# Patient Record
Sex: MALE | Race: WHITE | NOT HISPANIC OR LATINO | URBAN - METROPOLITAN AREA
[De-identification: names, ages, dates, MRNs, and addresses within clinical notes are randomized per-mention and may not be internally consistent; named-entity substitution may affect disease eponyms.]

---

## 2017-01-11 ENCOUNTER — OUTPATIENT (OUTPATIENT)
Dept: OUTPATIENT SERVICES | Facility: HOSPITAL | Age: 22
LOS: 1 days | End: 2017-01-11
Payer: COMMERCIAL

## 2017-01-11 DIAGNOSIS — E84.0 CYSTIC FIBROSIS WITH PULMONARY MANIFESTATIONS: ICD-10-CM

## 2017-01-11 PROCEDURE — 77001 FLUOROGUIDE FOR VEIN DEVICE: CPT

## 2017-01-11 PROCEDURE — 76937 US GUIDE VASCULAR ACCESS: CPT | Mod: 26

## 2017-01-11 PROCEDURE — 77001 FLUOROGUIDE FOR VEIN DEVICE: CPT | Mod: 26

## 2017-01-11 PROCEDURE — 36569 INSJ PICC 5 YR+ W/O IMAGING: CPT

## 2017-01-11 PROCEDURE — 76937 US GUIDE VASCULAR ACCESS: CPT

## 2017-01-11 PROCEDURE — C1751: CPT

## 2017-01-17 DIAGNOSIS — Z45.2 ENCOUNTER FOR ADJUSTMENT AND MANAGEMENT OF VASCULAR ACCESS DEVICE: ICD-10-CM

## 2017-01-17 DIAGNOSIS — E84.9 CYSTIC FIBROSIS, UNSPECIFIED: ICD-10-CM

## 2018-12-13 ENCOUNTER — APPOINTMENT (OUTPATIENT)
Dept: ORTHOPEDIC SURGERY | Facility: CLINIC | Age: 23
End: 2018-12-13
Payer: MEDICARE

## 2018-12-13 VITALS — BODY MASS INDEX: 28.32 KG/M2 | WEIGHT: 170 LBS | HEIGHT: 65 IN

## 2018-12-13 PROCEDURE — 73090 X-RAY EXAM OF FOREARM: CPT | Mod: RT

## 2018-12-13 PROCEDURE — 99203 OFFICE O/P NEW LOW 30 MIN: CPT

## 2019-01-21 ENCOUNTER — APPOINTMENT (OUTPATIENT)
Dept: ORTHOPEDIC SURGERY | Facility: CLINIC | Age: 24
End: 2019-01-21
Payer: COMMERCIAL

## 2019-01-21 VITALS — HEIGHT: 65 IN | WEIGHT: 170 LBS | BODY MASS INDEX: 28.32 KG/M2

## 2019-01-21 DIAGNOSIS — Z78.9 OTHER SPECIFIED HEALTH STATUS: ICD-10-CM

## 2019-01-21 DIAGNOSIS — Z82.61 FAMILY HISTORY OF ARTHRITIS: ICD-10-CM

## 2019-01-21 DIAGNOSIS — Z87.09 PERSONAL HISTORY OF OTHER DISEASES OF THE RESPIRATORY SYSTEM: ICD-10-CM

## 2019-01-21 DIAGNOSIS — Z80.9 FAMILY HISTORY OF MALIGNANT NEOPLASM, UNSPECIFIED: ICD-10-CM

## 2019-01-21 PROCEDURE — 99214 OFFICE O/P EST MOD 30 MIN: CPT

## 2019-01-21 NOTE — ADDENDUM
[FreeTextEntry1] : I, Siria Reynolds wrote this note acting as a scribe for Dr. Judah Sr on Jan 21, 2019.

## 2019-01-21 NOTE — PHYSICAL EXAM
[FreeTextEntry2] : Patient is WDWN, alert, and in no acute distress. Breathing is unlabored. He is grossly oriented to person, place, and time. \par \par Right Elbow:\par   Inspection/Palpation: No tenderness to palpation. Edema is present over the bursa. No deformities present. \par   Range of Motion: 0-120° \par   Strength: Flexion and extension 5/5\par   Stability: No joint instability on provocative testing. No skin lesions or discoloration.  [de-identified] : Xrays reviewed from  12/13/2018. AP, lateral and oblique views of the right elbow were obtained and revealed a healed olecranon fracture, plate and screws in place over the proximal ulna with soft tissue swelling. There is no evidence of new fracture. \par There is a radial and ulna shaft plate/screws in addition.Fractures are healed
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
## 2019-01-21 NOTE — DISCUSSION/SUMMARY
[de-identified] : The patient wishes to proceed with surgical removal of hardware from the right arm at this time. The risks and benefits were reviewed with the patient. All of his questions were answered. He will meet with our surgical scheduler. \par \par

## 2019-03-05 ENCOUNTER — OUTPATIENT (OUTPATIENT)
Dept: OUTPATIENT SERVICES | Facility: HOSPITAL | Age: 24
LOS: 1 days | End: 2019-03-05
Payer: COMMERCIAL

## 2019-03-05 DIAGNOSIS — J32.9 CHRONIC SINUSITIS, UNSPECIFIED: ICD-10-CM

## 2019-03-05 DIAGNOSIS — J47.9 BRONCHIECTASIS, UNCOMPLICATED: ICD-10-CM

## 2019-03-05 DIAGNOSIS — E84.0 CYSTIC FIBROSIS WITH PULMONARY MANIFESTATIONS: ICD-10-CM

## 2019-03-05 PROCEDURE — 36573 INSJ PICC RS&I 5 YR+: CPT

## 2019-03-05 PROCEDURE — C1751: CPT

## 2019-03-07 DIAGNOSIS — E84.9 CYSTIC FIBROSIS, UNSPECIFIED: ICD-10-CM

## 2019-03-07 DIAGNOSIS — Z45.2 ENCOUNTER FOR ADJUSTMENT AND MANAGEMENT OF VASCULAR ACCESS DEVICE: ICD-10-CM

## 2020-01-13 ENCOUNTER — APPOINTMENT (OUTPATIENT)
Dept: ORTHOPEDIC SURGERY | Facility: CLINIC | Age: 25
End: 2020-01-13
Payer: COMMERCIAL

## 2020-01-13 PROCEDURE — 73080 X-RAY EXAM OF ELBOW: CPT | Mod: RT

## 2020-01-13 PROCEDURE — 99213 OFFICE O/P EST LOW 20 MIN: CPT

## 2020-01-13 PROCEDURE — 73090 X-RAY EXAM OF FOREARM: CPT | Mod: RT

## 2020-01-14 NOTE — DISCUSSION/SUMMARY
[de-identified] : The patient is requesting that all the hardware be removed from the right elbow and forearm as he has been left with residual loss of full extension and discomfort. He was informed to the possible subsequent complications following hardware removal which include but are not limited to infections, impaired wound healing, refractures, tissue and nerve damage and post-operative bleeding or an incomplete removal of the painful hardware. He acknowledged what was relayed to him and is consenting. He would still like to proceed with the surgery.

## 2020-01-14 NOTE — PHYSICAL EXAM
[de-identified] : Upper Extremity: Patient is WDWN, alert, and in no acute distress. Breathing is unlabored. He is grossly oriented to person, place, and time. \par \par Right Elbow:\par  Inspection/Palpation: Fully healed incision. No tenderness to palpation. Edema is present over the bursa. No deformities present. \par  Range of Motion: 10 ° - 135°. Full supination and pronation.  \par  Strength: Flexion and extension 5/5\par  Stability: No joint instability on provocative testing. No skin lesions or discoloration.  [de-identified] : AP, lateral and oblique views of the right elbow were obtained and revealed a healed olecranon fracture, plate and screws in place over the proximal ulna with soft tissue swelling. There is no evidence of new fracture. \par \par X-rays of the forearm reveal a radial and ulna shaft plate/screws in addition.Fractures are healed.
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
## 2020-01-23 ENCOUNTER — OUTPATIENT (OUTPATIENT)
Dept: OUTPATIENT SERVICES | Facility: HOSPITAL | Age: 25
LOS: 1 days | End: 2020-01-23
Payer: COMMERCIAL

## 2020-01-23 VITALS
SYSTOLIC BLOOD PRESSURE: 116 MMHG | DIASTOLIC BLOOD PRESSURE: 78 MMHG | RESPIRATION RATE: 14 BRPM | HEIGHT: 65 IN | TEMPERATURE: 98 F | WEIGHT: 169.76 LBS | OXYGEN SATURATION: 99 % | HEART RATE: 83 BPM

## 2020-01-23 DIAGNOSIS — T84.84XA PAIN DUE TO INTERNAL ORTHOPEDIC PROSTHETIC DEVICES, IMPLANTS AND GRAFTS, INITIAL ENCOUNTER: ICD-10-CM

## 2020-01-23 DIAGNOSIS — Z01.818 ENCOUNTER FOR OTHER PREPROCEDURAL EXAMINATION: ICD-10-CM

## 2020-01-23 DIAGNOSIS — Z98.890 OTHER SPECIFIED POSTPROCEDURAL STATES: Chronic | ICD-10-CM

## 2020-01-23 DIAGNOSIS — M26.9 DENTOFACIAL ANOMALY, UNSPECIFIED: Chronic | ICD-10-CM

## 2020-01-23 LAB
ANION GAP SERPL CALC-SCNC: 7 MMOL/L — SIGNIFICANT CHANGE UP (ref 5–17)
BUN SERPL-MCNC: 15 MG/DL — SIGNIFICANT CHANGE UP (ref 7–23)
CALCIUM SERPL-MCNC: 9 MG/DL — SIGNIFICANT CHANGE UP (ref 8.4–10.5)
CHLORIDE SERPL-SCNC: 107 MMOL/L — SIGNIFICANT CHANGE UP (ref 96–108)
CO2 SERPL-SCNC: 32 MMOL/L — HIGH (ref 22–31)
CREAT SERPL-MCNC: 1.14 MG/DL — SIGNIFICANT CHANGE UP (ref 0.5–1.3)
GLUCOSE SERPL-MCNC: 99 MG/DL — SIGNIFICANT CHANGE UP (ref 70–99)
HCT VFR BLD CALC: 45.5 % — SIGNIFICANT CHANGE UP (ref 39–50)
HGB BLD-MCNC: 15.3 G/DL — SIGNIFICANT CHANGE UP (ref 13–17)
MCHC RBC-ENTMCNC: 30.7 PG — SIGNIFICANT CHANGE UP (ref 27–34)
MCHC RBC-ENTMCNC: 33.6 GM/DL — SIGNIFICANT CHANGE UP (ref 32–36)
MCV RBC AUTO: 91.2 FL — SIGNIFICANT CHANGE UP (ref 80–100)
NRBC # BLD: 0 /100 WBCS — SIGNIFICANT CHANGE UP (ref 0–0)
PLATELET # BLD AUTO: 290 K/UL — SIGNIFICANT CHANGE UP (ref 150–400)
POTASSIUM SERPL-MCNC: 4.6 MMOL/L — SIGNIFICANT CHANGE UP (ref 3.5–5.3)
POTASSIUM SERPL-SCNC: 4.6 MMOL/L — SIGNIFICANT CHANGE UP (ref 3.5–5.3)
RBC # BLD: 4.99 M/UL — SIGNIFICANT CHANGE UP (ref 4.2–5.8)
RBC # FLD: 12.5 % — SIGNIFICANT CHANGE UP (ref 10.3–14.5)
SODIUM SERPL-SCNC: 146 MMOL/L — HIGH (ref 135–145)
WBC # BLD: 8 K/UL — SIGNIFICANT CHANGE UP (ref 3.8–10.5)
WBC # FLD AUTO: 8 K/UL — SIGNIFICANT CHANGE UP (ref 3.8–10.5)

## 2020-01-23 PROCEDURE — 36415 COLL VENOUS BLD VENIPUNCTURE: CPT

## 2020-01-23 PROCEDURE — 80048 BASIC METABOLIC PNL TOTAL CA: CPT

## 2020-01-23 PROCEDURE — G0463: CPT

## 2020-01-23 PROCEDURE — 85027 COMPLETE CBC AUTOMATED: CPT

## 2020-01-23 NOTE — H&P PST ADULT - NSANTHOSAYNRD_GEN_A_CORE
No. MADELIN screening performed.  STOP BANG Legend: 0-2 = LOW Risk; 3-4 = INTERMEDIATE Risk; 5-8 = HIGH Risk

## 2020-01-23 NOTE — H&P PST ADULT - NSICDXPASTMEDICALHX_GEN_ALL_CORE_FT
PAST MEDICAL HISTORY:  Bowel Obstruction 12/94  was hospitalized and 12/96    Cystic Fibrosis     Nasal Polyp right    Pancreatic Insufficiency     Reactive Airway Disease     RSV (Respiratory Syncytial Virus) Hospitalized 1/95    Sinus Infection
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
## 2020-01-23 NOTE — H&P PST ADULT - NSICDXPROBLEM_GEN_ALL_CORE_FT
PROBLEM DIAGNOSES  Problem: Painful orthopaedic hardware  Assessment and Plan: Removal of hardware right elbow   Pre Op Instructions   Will obtain note from Cystic Fibrosis Specialist/PCP

## 2020-01-23 NOTE — H&P PST ADULT - NSICDXPASTSURGICALHX_GEN_ALL_CORE_FT
PAST SURGICAL HISTORY:  Acquired deformity of jaw Jaw reconstruction 2012    S/P functional endoscopic sinus surgery 2015,2019    S/P Hernia Surgery five  -  femoral  and inginual  1/97    S/P Ileostomy reversal 10/94    S/P Sinus Surgery 2004  and  2008    S/P Small Bowel Resection with ileostomy 8/94    S/P Tonsillectomy and Adenoidectomy 1/97    Status post open reduction and internal fixation (ORIF) of fracture Right proximal radius and ulnar fracture , right elbow reconstruction 2011

## 2020-01-23 NOTE — H&P PST ADULT - MUSCULOSKELETAL COMMENTS
right elbow pain s/p ORIF right elbow mild swelling noted healed surgical scar right elbow and forearm

## 2020-01-23 NOTE — H&P PST ADULT - HISTORY OF PRESENT ILLNESS
Pt is a 23 y/o male who had undergone right ORIF of elbow in 2011. Presents with complaint of right elbow decreased strength and pain especially when using arm. Pt scheduled for removal of hardware in right elbow on 1/31/20.

## 2020-01-30 ENCOUNTER — TRANSCRIPTION ENCOUNTER (OUTPATIENT)
Age: 25
End: 2020-01-30

## 2020-01-30 NOTE — ASU PATIENT PROFILE, ADULT - PSH
Acquired deformity of jaw  Jaw reconstruction 2012  S/P functional endoscopic sinus surgery  2015,2019  S/P Hernia Surgery  five  -  femoral  and inginual  1/97  S/P Ileostomy  reversal 10/94  S/P Sinus Surgery  2004  and  2008  S/P Small Bowel Resection  with ileostomy 8/94  S/P Tonsillectomy and Adenoidectomy  1/97  Status post open reduction and internal fixation (ORIF) of fracture  Right proximal radius and ulnar fracture , right elbow reconstruction 2011

## 2020-01-30 NOTE — ASU PATIENT PROFILE, ADULT - PMH
Bowel Obstruction  12/94  was hospitalized and 12/96  Cystic Fibrosis    Nasal Polyp  right  Pancreatic Insufficiency    Reactive Airway Disease    RSV (Respiratory Syncytial Virus)  Hospitalized 1/95  Sinus Infection

## 2020-01-31 ENCOUNTER — OUTPATIENT (OUTPATIENT)
Dept: OUTPATIENT SERVICES | Facility: HOSPITAL | Age: 25
LOS: 1 days | End: 2020-01-31
Payer: COMMERCIAL

## 2020-01-31 ENCOUNTER — APPOINTMENT (OUTPATIENT)
Dept: ORTHOPEDIC SURGERY | Facility: HOSPITAL | Age: 25
End: 2020-01-31

## 2020-01-31 ENCOUNTER — RESULT REVIEW (OUTPATIENT)
Age: 25
End: 2020-01-31

## 2020-01-31 VITALS
TEMPERATURE: 98 F | HEART RATE: 82 BPM | WEIGHT: 171.96 LBS | DIASTOLIC BLOOD PRESSURE: 78 MMHG | SYSTOLIC BLOOD PRESSURE: 112 MMHG | OXYGEN SATURATION: 97 % | HEIGHT: 65 IN | RESPIRATION RATE: 22 BRPM

## 2020-01-31 VITALS
HEART RATE: 82 BPM | RESPIRATION RATE: 20 BRPM | SYSTOLIC BLOOD PRESSURE: 105 MMHG | OXYGEN SATURATION: 97 % | DIASTOLIC BLOOD PRESSURE: 70 MMHG

## 2020-01-31 DIAGNOSIS — Z98.890 OTHER SPECIFIED POSTPROCEDURAL STATES: Chronic | ICD-10-CM

## 2020-01-31 DIAGNOSIS — T84.84XA PAIN DUE TO INTERNAL ORTHOPEDIC PROSTHETIC DEVICES, IMPLANTS AND GRAFTS, INITIAL ENCOUNTER: ICD-10-CM

## 2020-01-31 DIAGNOSIS — M26.9 DENTOFACIAL ANOMALY, UNSPECIFIED: Chronic | ICD-10-CM

## 2020-01-31 PROCEDURE — 88300 SURGICAL PATH GROSS: CPT | Mod: 26

## 2020-01-31 PROCEDURE — 76000 FLUOROSCOPY <1 HR PHYS/QHP: CPT

## 2020-01-31 PROCEDURE — 20680 REMOVAL OF IMPLANT DEEP: CPT | Mod: RT

## 2020-01-31 PROCEDURE — 20680 REMOVAL OF IMPLANT DEEP: CPT

## 2020-01-31 PROCEDURE — 88300 SURGICAL PATH GROSS: CPT

## 2020-01-31 RX ORDER — DORNASE ALFA 1 MG/ML
0 SOLUTION RESPIRATORY (INHALATION)
Qty: 0 | Refills: 0 | DISCHARGE

## 2020-01-31 RX ORDER — CHLORHEXIDINE GLUCONATE 213 G/1000ML
1 SOLUTION TOPICAL ONCE
Refills: 0 | Status: COMPLETED | OUTPATIENT
Start: 2020-01-31 | End: 2020-01-31

## 2020-01-31 RX ORDER — APREPITANT 80 MG/1
40 CAPSULE ORAL ONCE
Refills: 0 | Status: COMPLETED | OUTPATIENT
Start: 2020-01-31 | End: 2020-01-31

## 2020-01-31 RX ORDER — IBUPROFEN 200 MG
1 TABLET ORAL
Qty: 30 | Refills: 0
Start: 2020-01-31

## 2020-01-31 RX ORDER — ONDANSETRON 8 MG/1
4 TABLET, FILM COATED ORAL ONCE
Refills: 0 | Status: DISCONTINUED | OUTPATIENT
Start: 2020-01-31 | End: 2020-01-31

## 2020-01-31 RX ORDER — SODIUM CHLORIDE 9 MG/ML
1000 INJECTION, SOLUTION INTRAVENOUS
Refills: 0 | Status: DISCONTINUED | OUTPATIENT
Start: 2020-01-31 | End: 2020-01-31

## 2020-01-31 RX ORDER — CEFAZOLIN SODIUM 1 G
2000 VIAL (EA) INJECTION ONCE
Refills: 0 | Status: COMPLETED | OUTPATIENT
Start: 2020-01-31 | End: 2020-01-31

## 2020-01-31 RX ORDER — OXYCODONE AND ACETAMINOPHEN 5; 325 MG/1; MG/1
1 TABLET ORAL ONCE
Refills: 0 | Status: DISCONTINUED | OUTPATIENT
Start: 2020-01-31 | End: 2020-01-31

## 2020-01-31 RX ORDER — HYDROMORPHONE HYDROCHLORIDE 2 MG/ML
0.5 INJECTION INTRAMUSCULAR; INTRAVENOUS; SUBCUTANEOUS
Refills: 0 | Status: DISCONTINUED | OUTPATIENT
Start: 2020-01-31 | End: 2020-01-31

## 2020-01-31 RX ORDER — LIPASE/PROTEASE/AMYLASE 16-48-48K
0 CAPSULE,DELAYED RELEASE (ENTERIC COATED) ORAL
Qty: 0 | Refills: 0 | DISCHARGE

## 2020-01-31 RX ORDER — LANSOPRAZOLE 15 MG/1
0 CAPSULE, DELAYED RELEASE ORAL
Qty: 0 | Refills: 0 | DISCHARGE

## 2020-01-31 RX ADMIN — HYDROMORPHONE HYDROCHLORIDE 0.5 MILLIGRAM(S): 2 INJECTION INTRAMUSCULAR; INTRAVENOUS; SUBCUTANEOUS at 14:10

## 2020-01-31 RX ADMIN — CHLORHEXIDINE GLUCONATE 1 APPLICATION(S): 213 SOLUTION TOPICAL at 11:37

## 2020-01-31 RX ADMIN — HYDROMORPHONE HYDROCHLORIDE 0.5 MILLIGRAM(S): 2 INJECTION INTRAMUSCULAR; INTRAVENOUS; SUBCUTANEOUS at 14:23

## 2020-01-31 RX ADMIN — SODIUM CHLORIDE 100 MILLILITER(S): 9 INJECTION, SOLUTION INTRAVENOUS at 14:37

## 2020-01-31 RX ADMIN — HYDROMORPHONE HYDROCHLORIDE 0.5 MILLIGRAM(S): 2 INJECTION INTRAMUSCULAR; INTRAVENOUS; SUBCUTANEOUS at 14:35

## 2020-01-31 RX ADMIN — APREPITANT 40 MILLIGRAM(S): 80 CAPSULE ORAL at 10:01

## 2020-01-31 NOTE — ASU DISCHARGE PLAN (ADULT/PEDIATRIC) - CARE PROVIDER_API CALL
Judah Sr)  Orthopaedic Surgery  [address]  [place] [address]  Phone: [phone]  Fax: [phone]  Follow Up Time:

## 2020-01-31 NOTE — ASU DISCHARGE PLAN (ADULT/PEDIATRIC) - CALL YOUR DOCTOR IF YOU HAVE ANY OF THE FOLLOWING:
Numbness, tingling, color or temperature change to extremity/Bleeding that does not stop/Swelling that gets worse/Pain not relieved by Medications/Wound/Surgical Site with redness, or foul smelling discharge or pus/Fever greater than (need to indicate Fahrenheit or Celsius)

## 2020-01-31 NOTE — ASU DISCHARGE PLAN (ADULT/PEDIATRIC) - ASU DC SPECIAL INSTRUCTIONSFT
ice 20 minutes on alternating with 20 minutes off for 48 hours post op  keep dressing intact until seen in office

## 2020-02-10 ENCOUNTER — APPOINTMENT (OUTPATIENT)
Dept: ORTHOPEDIC SURGERY | Facility: CLINIC | Age: 25
End: 2020-02-10
Payer: COMMERCIAL

## 2020-02-10 DIAGNOSIS — S52.021S DISPLACED FRACTURE OF OLECRANON PROCESS W/OUT INTRAARTICULAR EXTENSION OF RIGHT ULNA, SEQUELA: ICD-10-CM

## 2020-02-10 DIAGNOSIS — S52.91XD UNSPECIFIED FRACTURE OF RIGHT FOREARM, SUBSEQUENT ENCOUNTER FOR CLOSED FRACTURE WITH ROUTINE HEALING: ICD-10-CM

## 2020-02-10 PROCEDURE — 73110 X-RAY EXAM OF WRIST: CPT | Mod: RT

## 2020-02-10 PROCEDURE — 73090 X-RAY EXAM OF FOREARM: CPT | Mod: RT

## 2020-02-10 PROCEDURE — 99024 POSTOP FOLLOW-UP VISIT: CPT

## 2020-02-10 NOTE — ADDENDUM
[FreeTextEntry1] : I, Siria Reynolds wrote this note acting as a scribe for Dr. Judah Sr on Feb 10, 2020.

## 2020-02-10 NOTE — HISTORY OF PRESENT ILLNESS
[de-identified] : s/p removal of hardware right olecranon on 01/31/2020.  [de-identified] : The patient is a 24 year old male who returns for the 1st postoperative visit after undergoing removal of hardware right olecranon at [facility]. The surgery was on 01/31/2020. The patient is recovering at home. The pain was initially more severe in the first two days following the surgery, but he now reports mild postoperative pain that is within tolerable limits. There is a considerable difference in sensation and improvement since having the hardware removed, especially when he rests the arm on a solid surface. Otherwise, he is doing well and is thus far pleased with the outcome of the surgery.  [de-identified] : Patient is WDWN, alert, and in no acute distress. Breathing is unlabored. He is grossly oriented to person, place, and time. \par \par Right Forearm: Proximal forearm incision is healing well. There are no signs of infection. Dissolvable sutures with steri strips are in place. Normal amount of postoperative edema, ecchymosis and tenderness present.  [de-identified] : AP, lateral and oblique views of the right elbow were obtained today and revealed a fully healed right olecranon and right forearm fracture with proximal olecranon plate and screws removed. Remaining hardware is in place.  [de-identified] : Patient was advised to avoid any heavy lifting or strenuous use involving the right arm until the 6 week healing period. A note for work was provided. Follow up in 6 weeks for repeat x-rays.

## 2020-02-10 NOTE — RETURN TO WORK/SCHOOL
[FreeTextEntry1] : Mr. CORDELL PERALES was seen in the office today on 02/10/2020 and evaluated by me for an Orthopedic visit. Please be advised that he will remain out of work for the next 6 weeks. \par \par Sincerely, \paulo Sr MD

## 2020-04-27 NOTE — ASU PATIENT PROFILE, ADULT - NS PRO PT RIGHT SUPPORT PERSON
same name as above Otezla Counseling: The side effects of Otezla were discussed with the patient, including but not limited to worsening or new depression, weight loss, diarrhea, nausea, upper respiratory tract infection, and headache. Patient instructed to call the office should any adverse effect occur.  The patient verbalized understanding of the proper use and possible adverse effects of Otezla.  All the patient's questions and concerns were addressed.

## 2020-12-17 NOTE — ADDENDUM
CHIEF COMPLAINT:   Patient presents with:  Rash Skin Problem: s/s rash itchy chest / back red spots OTC ointment      HPI:    Meeta Porter is a 66year old male who presents for evaluation of a rash. Per patient rash started in the past 3 days.  Rash ha [FreeTextEntry1] : I, Siria Reynolds wrote this note acting as a scribe for Dr. Judah Sr on Jan 13, 2020.  LUNGS: Denies shortness of breath with exertion or rest. No cough or wheezing. LYMPH: Denies enlargement of the lymph nodes. MUSC/SKEL: Denies joint swelling or joint stiffness. GI: Denies abdominal pain, N/V/C/D.   NEURO: Denies abnormal sensation, ting Pityriasis rosea is a harmless non-contagious rash. The exact cause is unknown. It's not an allergic reaction, and doesn't seem to be caused by a viral or fungal infection.  Although anyone can get it, it's most often seen in children and young adults ages © 7841-6782 [employer] [address]. [address] [facility], [facility] [place]. All rights reserved. This information is not intended as a substitute for professional medical care. Always follow your healthcare professional's instructions.         Ardell Baumgarten · Calamine lotion. This is a pink, watery lotion that can help stop itching. · Antihistamine. This medicine can help reduce itching. You can put it on the skin as a cream or take it by mouth as a pill.   · Other anti-itch lotion or cream. Ask your healthca

## 2024-10-14 ENCOUNTER — OUTPATIENT (OUTPATIENT)
Dept: OUTPATIENT SERVICES | Facility: HOSPITAL | Age: 29
LOS: 1 days | End: 2024-10-14
Payer: COMMERCIAL

## 2024-10-14 VITALS
HEART RATE: 86 BPM | DIASTOLIC BLOOD PRESSURE: 79 MMHG | SYSTOLIC BLOOD PRESSURE: 115 MMHG | RESPIRATION RATE: 18 BRPM | OXYGEN SATURATION: 95 % | TEMPERATURE: 98 F

## 2024-10-14 VITALS
RESPIRATION RATE: 16 BRPM | DIASTOLIC BLOOD PRESSURE: 73 MMHG | HEART RATE: 84 BPM | TEMPERATURE: 98 F | OXYGEN SATURATION: 97 % | SYSTOLIC BLOOD PRESSURE: 114 MMHG

## 2024-10-14 DIAGNOSIS — M26.9 DENTOFACIAL ANOMALY, UNSPECIFIED: Chronic | ICD-10-CM

## 2024-10-14 DIAGNOSIS — Z98.890 OTHER SPECIFIED POSTPROCEDURAL STATES: Chronic | ICD-10-CM

## 2024-10-14 DIAGNOSIS — E84.9 CYSTIC FIBROSIS, UNSPECIFIED: ICD-10-CM

## 2024-10-14 PROCEDURE — 36573 INSJ PICC RS&I 5 YR+: CPT

## 2024-10-26 DIAGNOSIS — Z45.2 ENCOUNTER FOR ADJUSTMENT AND MANAGEMENT OF VASCULAR ACCESS DEVICE: ICD-10-CM

## 2024-10-26 DIAGNOSIS — Z87.09 PERSONAL HISTORY OF OTHER DISEASES OF THE RESPIRATORY SYSTEM: ICD-10-CM
